# Patient Record
Sex: FEMALE | Race: WHITE | Employment: UNEMPLOYED | URBAN - METROPOLITAN AREA
[De-identification: names, ages, dates, MRNs, and addresses within clinical notes are randomized per-mention and may not be internally consistent; named-entity substitution may affect disease eponyms.]

---

## 2020-01-06 ENCOUNTER — OFFICE VISIT (OUTPATIENT)
Dept: FAMILY MEDICINE CLINIC | Facility: CLINIC | Age: 25
End: 2020-01-06
Payer: COMMERCIAL

## 2020-01-06 VITALS
DIASTOLIC BLOOD PRESSURE: 85 MMHG | OXYGEN SATURATION: 98 % | BODY MASS INDEX: 35.44 KG/M2 | TEMPERATURE: 99 F | HEIGHT: 63 IN | RESPIRATION RATE: 18 BRPM | WEIGHT: 200 LBS | SYSTOLIC BLOOD PRESSURE: 132 MMHG | HEART RATE: 98 BPM

## 2020-01-06 DIAGNOSIS — J22 LRTI (LOWER RESPIRATORY TRACT INFECTION): Primary | ICD-10-CM

## 2020-01-06 PROCEDURE — 99202 OFFICE O/P NEW SF 15 MIN: CPT | Performed by: NURSE PRACTITIONER

## 2020-01-06 RX ORDER — VALACYCLOVIR HYDROCHLORIDE 500 MG/1
TABLET, FILM COATED ORAL 2 TIMES DAILY
COMMUNITY
End: 2021-03-18

## 2020-01-06 RX ORDER — AZITHROMYCIN 250 MG/1
TABLET, FILM COATED ORAL
Qty: 6 TABLET | Refills: 0 | Status: SHIPPED | OUTPATIENT
Start: 2020-01-06 | End: 2020-03-03 | Stop reason: ALTCHOICE

## 2020-01-06 RX ORDER — PREDNISONE 10 MG/1
30 TABLET ORAL DAILY
Qty: 15 TABLET | Refills: 0 | Status: SHIPPED | OUTPATIENT
Start: 2020-01-06 | End: 2020-01-11

## 2020-01-06 RX ORDER — ALBUTEROL SULFATE 90 UG/1
2 AEROSOL, METERED RESPIRATORY (INHALATION) EVERY 4 HOURS PRN
Qty: 1 INHALER | Refills: 6 | Status: SHIPPED | OUTPATIENT
Start: 2020-01-06

## 2020-01-06 RX ORDER — ALBUTEROL SULFATE 2.5 MG/3ML
2.5 SOLUTION RESPIRATORY (INHALATION) EVERY 6 HOURS PRN
Qty: 30 VIAL | Refills: 0 | Status: SHIPPED | OUTPATIENT
Start: 2020-01-06

## 2020-01-06 NOTE — PROGRESS NOTES
CHIEF COMPLAINT:   Patient presents with:  URI  Cough      HPI:   Bam Mahmood is a 27year old female who presents for upper respiratory symptoms and cough w/ congestion. Cough, chest congestion, wheezing. Febrile initially.  OTC: cough suppressants, al EARS: TM's intact, non- bulging,  No retraction, no fluid, bony landmarks present  NOSE: Nostrils patent, scant nasal discharge, nasal mucosa mildly inflamed and edematous  THROAT: Oral mucosa pink, moist. Posterior pharynx is without erythema.  negative ex Bronchitis is an infection of the air passages. It often occurs during a cold and is usually caused by a virus. Symptoms include cough with mucus (phlegm) and low-grade fever.  This illness is contagious during the first few days and is spread through the a · Over-the-counter cough, cold, and sore-throat medicines will not shorten the length of the illness, but they may be helpful to reduce symptoms.  (Note: Don't use decongestants if you have high blood pressure.)  · If you were given an inhaler, use it exact

## 2020-03-03 ENCOUNTER — OFFICE VISIT (OUTPATIENT)
Dept: FAMILY MEDICINE CLINIC | Facility: CLINIC | Age: 25
End: 2020-03-03
Payer: COMMERCIAL

## 2020-03-03 VITALS
HEART RATE: 88 BPM | OXYGEN SATURATION: 98 % | BODY MASS INDEX: 37.39 KG/M2 | RESPIRATION RATE: 18 BRPM | HEIGHT: 63 IN | SYSTOLIC BLOOD PRESSURE: 105 MMHG | WEIGHT: 211 LBS | DIASTOLIC BLOOD PRESSURE: 55 MMHG | TEMPERATURE: 98 F

## 2020-03-03 DIAGNOSIS — F17.200 CURRENT SMOKER: ICD-10-CM

## 2020-03-03 DIAGNOSIS — J01.10 ACUTE NON-RECURRENT FRONTAL SINUSITIS: Primary | ICD-10-CM

## 2020-03-03 PROCEDURE — 99213 OFFICE O/P EST LOW 20 MIN: CPT | Performed by: NURSE PRACTITIONER

## 2020-03-03 RX ORDER — DOXYCYCLINE HYCLATE 100 MG/1
100 CAPSULE ORAL 2 TIMES DAILY
Qty: 20 CAPSULE | Refills: 0 | Status: SHIPPED | OUTPATIENT
Start: 2020-03-03 | End: 2020-03-13

## 2020-03-03 NOTE — PROGRESS NOTES
CHIEF COMPLAINT:   Patient presents with:  Cough  Chest Congestion  Cough/URI      HPI:   Dena Tomas is a 27year old female who presents for cold symptoms for  1  weeks. Symptoms have progressed into sinus congestion and been worsening since onset.  Leighton Patel EXAM:   /55 (BP Location: Left arm, Patient Position: Sitting, Cuff Size: large)   Pulse 88   Temp 98 °F (36.7 °C) (Oral)   Resp 18   Ht 63\"   Wt 211 lb (95.7 kg)   LMP 02/02/2020 (Exact Date)   SpO2 98%   BMI 37.38 kg/m²   GENERAL: well developed, Sinusitis can often be managed with self-care. Self-care can keep sinuses moist and make you feel more comfortable. Remember to follow your doctor's instructions closely. This can make a big difference in getting your sinus problem under control.   Drink fl The sinuses are air-filled spaces within the bones of the face. They connect to the inside of the nose. Sinusitis is an inflammation of the tissue that lines the sinuses. Sinusitis can occur during a cold.  It can also happen due to allergies to pollens and · Do not use nasal rinses or irrigation during an acute sinus infection, unless your healthcare provider tells you to. Rinsing may spread the infection to other areas in your sinuses.   · Use acetaminophen or ibuprofen to control pain, unless another pain m

## 2020-03-03 NOTE — PATIENT INSTRUCTIONS
Self-Care for Sinusitis     Drinking plenty of water can help sinuses drain. Sinusitis can often be managed with self-care. Self-care can keep sinuses moist and make you feel more comfortable. Remember to follow your doctor's instructions closely.  Natalia Frankel Sinusitis (Antibiotic Treatment)    The sinuses are air-filled spaces within the bones of the face. They connect to the inside of the nose. Sinusitis is an inflammation of the tissue that lines the sinuses. Sinusitis can occur during a cold.  It can also ha · Do not use nasal rinses or irrigation during an acute sinus infection, unless your healthcare provider tells you to. Rinsing may spread the infection to other areas in your sinuses.   · Use acetaminophen or ibuprofen to control pain, unless another pain m

## 2020-03-13 ENCOUNTER — HOSPITAL ENCOUNTER (OUTPATIENT)
Age: 25
Discharge: HOME | End: 2020-03-13
Attending: EMERGENCY MEDICINE
Payer: COMMERCIAL

## 2020-03-13 VITALS
SYSTOLIC BLOOD PRESSURE: 129 MMHG | WEIGHT: 200 LBS | BODY MASS INDEX: 35 KG/M2 | TEMPERATURE: 99 F | HEART RATE: 75 BPM | OXYGEN SATURATION: 98 % | DIASTOLIC BLOOD PRESSURE: 57 MMHG | RESPIRATION RATE: 18 BRPM

## 2020-03-13 DIAGNOSIS — J40 BRONCHITIS: Primary | ICD-10-CM

## 2020-03-13 PROCEDURE — 99204 OFFICE O/P NEW MOD 45 MIN: CPT

## 2020-03-13 PROCEDURE — 99213 OFFICE O/P EST LOW 20 MIN: CPT

## 2020-03-13 RX ORDER — BENZONATATE 100 MG/1
100 CAPSULE ORAL 3 TIMES DAILY PRN
Qty: 30 CAPSULE | Refills: 0 | Status: SHIPPED | OUTPATIENT
Start: 2020-03-13 | End: 2020-04-12

## 2020-03-13 RX ORDER — PREDNISONE 20 MG/1
40 TABLET ORAL DAILY
Qty: 8 TABLET | Refills: 0 | Status: SHIPPED | OUTPATIENT
Start: 2020-03-13 | End: 2020-03-17

## 2020-03-13 NOTE — ED INITIAL ASSESSMENT (HPI)
Pt has had a cough since march 1st.  Pt has been on doxycyline given by the walkin clinic. Pt states not better.

## 2020-03-13 NOTE — ED PROVIDER NOTES
Patient Seen in: [facility] In [address] [place] [address]    History   Patient presents with:  Cough/URI    Stated Complaint: cough f/u    HPI    Patient here with cough, congestion for 20+ days. No travel, no known sick contacts.   Patient denies si Temporal   SpO2 98 %   O2 Device None (Room air)       Current:/57   Pulse 75   Temp 98.6 °F (37 °C) (Temporal)   Resp 18   Wt 90.7 kg   LMP 03/06/2020   SpO2 98%   BMI 35.43 kg/m²   PULSE OX Nl on room air    GENERAL: awake, non toxic, no sig resp d

## 2020-10-10 ENCOUNTER — HOSPITAL ENCOUNTER (EMERGENCY)
Facility: HOSPITAL | Age: 25
Discharge: HOME | End: 2020-10-10
Payer: COMMERCIAL

## 2020-10-10 ENCOUNTER — APPOINTMENT (OUTPATIENT)
Dept: GENERAL RADIOLOGY | Facility: HOSPITAL | Age: 25
End: 2020-10-10
Payer: COMMERCIAL

## 2020-10-10 VITALS
BODY MASS INDEX: 34.55 KG/M2 | TEMPERATURE: 99 F | RESPIRATION RATE: 18 BRPM | HEART RATE: 75 BPM | DIASTOLIC BLOOD PRESSURE: 65 MMHG | OXYGEN SATURATION: 98 % | SYSTOLIC BLOOD PRESSURE: 111 MMHG | HEIGHT: 63 IN | WEIGHT: 195 LBS

## 2020-10-10 DIAGNOSIS — S52.121A CLOSED DISPLACED FRACTURE OF HEAD OF RIGHT RADIUS, INITIAL ENCOUNTER: Primary | ICD-10-CM

## 2020-10-10 PROCEDURE — 99284 EMERGENCY DEPT VISIT MOD MDM: CPT

## 2020-10-10 PROCEDURE — 73080 X-RAY EXAM OF ELBOW: CPT

## 2020-10-10 RX ORDER — HYDROCODONE BITARTRATE AND ACETAMINOPHEN 5; 325 MG/1; MG/1
1 TABLET ORAL ONCE
Status: COMPLETED | OUTPATIENT
Start: 2020-10-10 | End: 2020-10-10

## 2020-10-10 RX ORDER — HYDROCODONE BITARTRATE AND ACETAMINOPHEN 5; 325 MG/1; MG/1
1-2 TABLET ORAL EVERY 6 HOURS PRN
Qty: 10 TABLET | Refills: 0 | Status: SHIPPED | OUTPATIENT
Start: 2020-10-10 | End: 2020-10-17

## 2020-10-11 NOTE — ED PROVIDER NOTES
Patient Seen in: [facility] Emergency Department      History   Patient presents with:  Arm or Hand Injury    Stated Complaint: right elbow pain; fell while skating    HPI    22-year-old female presents to the emergency department with right elbow Capillary Refill: Capillary refill takes less than 2 seconds. Neurological:      General: No focal deficit present. Mental Status: She is alert and oriented to person, place, and time.            ED Course   Labs Reviewed - No data to display       P

## 2020-10-11 NOTE — ED INITIAL ASSESSMENT (HPI)
[place] her right elbow. Pulses intact.  States \"it feels like its locked\"

## 2020-10-12 ENCOUNTER — HOSPITAL ENCOUNTER (OUTPATIENT)
Dept: CT IMAGING | Age: 25
Discharge: HOME | End: 2020-10-12
Attending: ORTHOPAEDIC SURGERY
Payer: COMMERCIAL

## 2020-10-12 ENCOUNTER — OFFICE VISIT (OUTPATIENT)
Dept: ORTHOPEDICS CLINIC | Facility: CLINIC | Age: 25
End: 2020-10-12
Payer: COMMERCIAL

## 2020-10-12 VITALS
DIASTOLIC BLOOD PRESSURE: 72 MMHG | BODY MASS INDEX: 34.55 KG/M2 | SYSTOLIC BLOOD PRESSURE: 102 MMHG | HEIGHT: 63 IN | HEART RATE: 65 BPM | WEIGHT: 195 LBS

## 2020-10-12 DIAGNOSIS — S52.121A CLOSED DISPLACED FRACTURE OF HEAD OF RIGHT RADIUS, INITIAL ENCOUNTER: Primary | ICD-10-CM

## 2020-10-12 DIAGNOSIS — S52.121A CLOSED DISPLACED FRACTURE OF HEAD OF RIGHT RADIUS, INITIAL ENCOUNTER: ICD-10-CM

## 2020-10-12 PROCEDURE — 76376 3D RENDER W/INTRP POSTPROCES: CPT | Performed by: ORTHOPAEDIC SURGERY

## 2020-10-12 PROCEDURE — 20605 DRAIN/INJ JOINT/BURSA W/O US: CPT | Performed by: ORTHOPAEDIC SURGERY

## 2020-10-12 PROCEDURE — 3078F DIAST BP <80 MM HG: CPT | Performed by: ORTHOPAEDIC SURGERY

## 2020-10-12 PROCEDURE — 3008F BODY MASS INDEX DOCD: CPT | Performed by: ORTHOPAEDIC SURGERY

## 2020-10-12 PROCEDURE — 24650 CLTX RDL HEAD/NCK FX WO MNPJ: CPT | Performed by: ORTHOPAEDIC SURGERY

## 2020-10-12 PROCEDURE — 99243 OFF/OP CNSLTJ NEW/EST LOW 30: CPT | Performed by: ORTHOPAEDIC SURGERY

## 2020-10-12 PROCEDURE — 3074F SYST BP LT 130 MM HG: CPT | Performed by: ORTHOPAEDIC SURGERY

## 2020-10-12 PROCEDURE — 73200 CT UPPER EXTREMITY W/O DYE: CPT | Performed by: ORTHOPAEDIC SURGERY

## 2020-10-12 NOTE — PROCEDURES
The patient identified the right elbow as the correct procedure site. This was verified with the consent and with 2 patient identifiers.  The skin over the elbow lateral soft spot between the lateral epicondyle, olecranon and radial headwas prepped with be

## 2020-10-12 NOTE — H&P
NURSING INTAKE COMMENTS: Patient presents with:  Elbow Pain: right elbow fx, fell rollerskating      HPI: This 23year old female presents today with complaints of right elbow injury.   Patient sustained an acute injury to her right elbow when she fell dire denies blurred vision or double vision  HEENT: denies new nasal congestion  PULM: denies shortness of breath or cough  CARDIOVASCULAR: denies chest pain  GI: denies emesis, no diarrhea  :  denies dysuria or difficulty urinating  MUSCULOSKELETAL: See HPI were obtained. FINDINGS:  BONES: A mildly displaced acute obliquely oriented fracture of the radial head is evident. Minimal depression of the radial head ectasia surface is noted. No suspicious osseous lesions are seen.  The radiocapitellar and anterior h Patient expressed understanding and is amenable to this plan of care. The above note was creating using Dragon speech recognition technology. Please excuse any typos.     Reyes Sosa MD

## 2020-10-20 ENCOUNTER — HOSPITAL ENCOUNTER (OUTPATIENT)
Dept: GENERAL RADIOLOGY | Facility: HOSPITAL | Age: 25
Discharge: HOME | End: 2020-10-20
Attending: ORTHOPAEDIC SURGERY
Payer: COMMERCIAL

## 2020-10-20 ENCOUNTER — OFFICE VISIT (OUTPATIENT)
Dept: ORTHOPEDICS CLINIC | Facility: CLINIC | Age: 25
End: 2020-10-20
Payer: COMMERCIAL

## 2020-10-20 DIAGNOSIS — Z47.89 ORTHOPEDIC AFTERCARE: ICD-10-CM

## 2020-10-20 DIAGNOSIS — S52.124D CLOSED NONDISPLACED FRACTURE OF HEAD OF RIGHT RADIUS WITH ROUTINE HEALING, SUBSEQUENT ENCOUNTER: Primary | ICD-10-CM

## 2020-10-20 DIAGNOSIS — S52.045D NONDISPLACED FRACTURE OF CORONOID PROCESS OF LEFT ULNA, SUBSEQUENT ENCOUNTER FOR CLOSED FRACTURE WITH ROUTINE HEALING: ICD-10-CM

## 2020-10-20 PROCEDURE — 99024 POSTOP FOLLOW-UP VISIT: CPT | Performed by: ORTHOPAEDIC SURGERY

## 2020-10-20 PROCEDURE — 73080 X-RAY EXAM OF ELBOW: CPT | Performed by: ORTHOPAEDIC SURGERY

## 2020-10-20 NOTE — PROGRESS NOTES
NURSING INTAKE COMMENTS: Patient presents with:  Elbow Pain: folow up, increased ROM      HPI: This 23year old female presents today with complaints of 1 week follow-up evaluation right elbow fracture.   The patient sustained an acute injury to her right e touch and motor strength intact grossly  Musculoskeletal: Right elbow exam demonstrates no warmth or erythema. There is moderate edema about the elbow. There is tenderness to palpation of the radial head.   Significantly improved active elbow range of mot INDICATIONS: S52.121A Displaced fracture of head of right radius, initial encounter for closed fracture  TECHNIQUE: Multi-planar CT images of the right elbow were obtained without intravenous contrast material.  Automated exposure control for dose reductio found for: GLU, BUN, CREATSERUM, GFR, GFRNAA, GFRAA     Assessment and Plan:  Diagnoses and all orders for this visit:    Closed nondisplaced fracture of head of right radius with routine healing, subsequent encounter    Orthopedic aftercare  -     XR ELBO

## 2020-11-06 ENCOUNTER — OFFICE VISIT (OUTPATIENT)
Dept: ORTHOPEDICS CLINIC | Facility: CLINIC | Age: 25
End: 2020-11-06
Payer: COMMERCIAL

## 2020-11-06 ENCOUNTER — HOSPITAL ENCOUNTER (OUTPATIENT)
Dept: GENERAL RADIOLOGY | Facility: HOSPITAL | Age: 25
Discharge: HOME | End: 2020-11-06
Attending: ORTHOPAEDIC SURGERY
Payer: COMMERCIAL

## 2020-11-06 DIAGNOSIS — S52.045D NONDISPLACED FRACTURE OF CORONOID PROCESS OF LEFT ULNA, SUBSEQUENT ENCOUNTER FOR CLOSED FRACTURE WITH ROUTINE HEALING: ICD-10-CM

## 2020-11-06 DIAGNOSIS — S52.124D CLOSED NONDISPLACED FRACTURE OF HEAD OF RIGHT RADIUS WITH ROUTINE HEALING, SUBSEQUENT ENCOUNTER: Primary | ICD-10-CM

## 2020-11-06 DIAGNOSIS — Z47.89 ORTHOPEDIC AFTERCARE: ICD-10-CM

## 2020-11-06 PROCEDURE — 99024 POSTOP FOLLOW-UP VISIT: CPT | Performed by: ORTHOPAEDIC SURGERY

## 2020-11-06 PROCEDURE — 99072 ADDL SUPL MATRL&STAF TM PHE: CPT | Performed by: ORTHOPAEDIC SURGERY

## 2020-11-06 PROCEDURE — 73070 X-RAY EXAM OF ELBOW: CPT | Performed by: ORTHOPAEDIC SURGERY

## 2020-11-06 NOTE — PROGRESS NOTES
NURSING INTAKE COMMENTS: Patient presents with:  Fracture: Right elbow f/u - has stifness and she can't extended - has a better ROM       HPI: This 23year old female presents today with complaints of right elbow radial head and coronoid fracture follow-up benign  Neurologic: Bilateral sensation intact to light touch and motor strength intact grossly  Musculoskeletal: Right elbow exam demonstrates no warmth, no erythema, no edema. There is no tenderness to palpation about the elbow.   Active elbow range of m Brisa Berry MD on 10/20/2020 at 3:53 PM     Finalized by (CST): Chacorta Hearn MD on 10/20/2020 at 3:57 PM          Xr Elbow, Complete (min 3 Views), Right (cpt=73080)    Result Date: 10/11/2020  PROCEDURE: XR ELBOW, COMPLETE (MIN 3 VIEWS), RIGHT (CPT=7308 iterative reconstruction technique for dose reduction was used. Dose information is transmitted to the  [facility] of Radiology) NRDR ([address]) which includes the Dose Index Registry.   FINDINGS: Thin sections and 3-D rec with routine healing        Plan: Continue gentle stretching exercises as tolerated. No lifting greater than 5 pounds and no high risk activities. Follow-up in 3 weeks for repeat evaluation and x-rays.     The above note was creating using Dragon speech r

## 2021-03-18 ENCOUNTER — OFFICE VISIT (OUTPATIENT)
Dept: OBGYN CLINIC | Facility: CLINIC | Age: 26
End: 2021-03-18
Payer: COMMERCIAL

## 2021-03-18 VITALS
DIASTOLIC BLOOD PRESSURE: 73 MMHG | BODY MASS INDEX: 40 KG/M2 | SYSTOLIC BLOOD PRESSURE: 111 MMHG | HEART RATE: 79 BPM | WEIGHT: 226 LBS

## 2021-03-18 DIAGNOSIS — Z30.011 ORAL CONTRACEPTION INITIAL PRESCRIPTION: ICD-10-CM

## 2021-03-18 DIAGNOSIS — Z11.3 ROUTINE SCREENING FOR STI (SEXUALLY TRANSMITTED INFECTION): ICD-10-CM

## 2021-03-18 DIAGNOSIS — N89.8 VAGINAL ITCHING: Primary | ICD-10-CM

## 2021-03-18 DIAGNOSIS — B37.3 YEAST INFECTION OF THE VAGINA: ICD-10-CM

## 2021-03-18 DIAGNOSIS — A60.00 GENITAL HERPES SIMPLEX, UNSPECIFIED SITE: ICD-10-CM

## 2021-03-18 PROBLEM — Z86.19 HISTORY OF HERPES GENITALIS: Status: ACTIVE | Noted: 2021-03-18

## 2021-03-18 PROBLEM — Z30.41 USES ORAL CONTRACEPTION: Status: ACTIVE | Noted: 2021-03-18

## 2021-03-18 PROCEDURE — 3078F DIAST BP <80 MM HG: CPT | Performed by: ADVANCED PRACTICE MIDWIFE

## 2021-03-18 PROCEDURE — 87220 TISSUE EXAM FOR FUNGI: CPT | Performed by: ADVANCED PRACTICE MIDWIFE

## 2021-03-18 PROCEDURE — 3074F SYST BP LT 130 MM HG: CPT | Performed by: ADVANCED PRACTICE MIDWIFE

## 2021-03-18 PROCEDURE — 99203 OFFICE O/P NEW LOW 30 MIN: CPT | Performed by: ADVANCED PRACTICE MIDWIFE

## 2021-03-18 PROCEDURE — 87210 SMEAR WET MOUNT SALINE/INK: CPT | Performed by: ADVANCED PRACTICE MIDWIFE

## 2021-03-18 RX ORDER — VALACYCLOVIR HYDROCHLORIDE 1 G/1
1 TABLET, FILM COATED ORAL DAILY
Qty: 30 TABLET | Refills: 1 | Status: SHIPPED | OUTPATIENT
Start: 2021-03-18 | End: 2021-03-23

## 2021-03-18 RX ORDER — FLUCONAZOLE 150 MG/1
150 TABLET ORAL ONCE
Qty: 2 TABLET | Refills: 0 | Status: SHIPPED | OUTPATIENT
Start: 2021-03-18 | End: 2021-03-18

## 2021-03-18 RX ORDER — LEVONORGESTREL AND ETHINYL ESTRADIOL 0.1-0.02MG
1 KIT ORAL DAILY
Qty: 1 PACKAGE | Refills: 11 | Status: SHIPPED | OUTPATIENT
Start: 2021-03-18 | End: 2021-05-20

## 2021-03-18 NOTE — PROGRESS NOTES
HPI/Subjective:   Patient ID: Breezy Matson is a 23year old female. 25yo G0 presents with complaint of vaginal discharge and itching since Tuesday. Reports thick white discharge.  History of genital herpes and requesting refill on valcyclovir which sh lesion or injury. Vagina: No signs of injury and foreign body. Vaginal discharge present. No erythema, tenderness, bleeding, lesions or prolapsed vaginal walls. Cervix: No discharge, friability, lesion, erythema, cervical bleeding or eversion. from intercourse during outbreaks    Patient to return to care for well person exam and pap smear.     30 minutes spent reviewing chart, counseling and examining patient and charting

## 2021-03-18 NOTE — PATIENT INSTRUCTIONS
Yeast Infection (Candida Vaginal Infection)    You have a Candida vaginal infection. This is also known as a yeast infection. It's most often caused by a type of yeast (fungus) called Candida. Candida are normally found in the vagina.  But if they increas educational content on 7/1/2020  © 6823-0791 [employer]. All rights reserved. This information is not intended as a substitute for professional medical care. Always follow your healthcare professional's instructions. Birth Control:  Edwin Billy pressure or gallbladder, liver, cerebrovascular or heart disease  · You have diabetes, migraines, blood clot in the vein or artery, lupus, depression, certain lipid disorders, or take medicines that interfere with the pill  In these cases, discuss the risk

## 2021-03-19 LAB
C TRACH DNA SPEC QL NAA+PROBE: POSITIVE
N GONORRHOEA DNA SPEC QL NAA+PROBE: NEGATIVE

## 2021-03-20 LAB
GENITAL VAGINOSIS SCREEN: NEGATIVE
TRICHOMONAS SCREEN: NEGATIVE

## 2021-03-21 ENCOUNTER — TELEPHONE (OUTPATIENT)
Dept: OBGYN CLINIC | Facility: CLINIC | Age: 26
End: 2021-03-21

## 2021-03-21 DIAGNOSIS — A74.9 CHLAMYDIA INFECTION: Primary | ICD-10-CM

## 2021-03-21 NOTE — TELEPHONE ENCOUNTER
Phone call to patient to review lab results. Informed patient of positive chlamydia. Patient denies pelvic pain. States partner has groin pain. Discussed risks of pelvic inflammatory disease and importance of treatment of chlamydia in order to prevent.  Bryon

## 2021-03-22 ENCOUNTER — TELEPHONE (OUTPATIENT)
Dept: OBGYN CLINIC | Facility: CLINIC | Age: 26
End: 2021-03-22

## 2021-03-22 NOTE — TELEPHONE ENCOUNTER
Advised pt that rx was called in this morning & information confirmed. Reviewed abstaining from intercourse x7 days following treatment & jassi in 3 mos. Order placed for jassi.  Pt verbalized an understanding & agrees w/ plan

## 2021-03-22 NOTE — TELEPHONE ENCOUNTER
----- Message from Donovan Soares CNM sent at 3/21/2021 12:25 PM CDT -----  I called patient to inform her of results and sent her treatment. Partner's name is David PRINCE 89. She said to send his treatment to the same pharmacy. Thanks!

## 2021-04-10 RX ORDER — VALACYCLOVIR HYDROCHLORIDE 1 G/1
TABLET, FILM COATED ORAL
Qty: 30 TABLET | Refills: 1 | OUTPATIENT
Start: 2021-04-10

## 2021-04-10 NOTE — TELEPHONE ENCOUNTER
Spoke w/ pt regarding refill req. Pt states she did not req refill & does not need any. She only takes for outbreaks & has many left. Advised pt she missed her appt w/ [employer] on 4/8. Pt states she was busy moving. appt rescheduled.  Pt verbalized an understanding & agrees w/ plan

## 2021-04-12 ENCOUNTER — LAB ENCOUNTER (OUTPATIENT)
Dept: LAB | Facility: HOSPITAL | Age: 26
End: 2021-04-12
Attending: ADVANCED PRACTICE MIDWIFE
Payer: COMMERCIAL

## 2021-04-12 ENCOUNTER — TELEPHONE (OUTPATIENT)
Dept: OBGYN CLINIC | Facility: CLINIC | Age: 26
End: 2021-04-12

## 2021-04-12 DIAGNOSIS — Z32.00 PREGNANCY EXAMINATION OR TEST, PREGNANCY UNCONFIRMED: Primary | ICD-10-CM

## 2021-04-12 DIAGNOSIS — Z32.00 PREGNANCY EXAMINATION OR TEST, PREGNANCY UNCONFIRMED: ICD-10-CM

## 2021-04-12 PROCEDURE — 36415 COLL VENOUS BLD VENIPUNCTURE: CPT

## 2021-04-12 PROCEDURE — 84702 CHORIONIC GONADOTROPIN TEST: CPT

## 2021-04-12 NOTE — TELEPHONE ENCOUNTER
Pt states she started the OCP's 7 days ago. Pt is tired and sleepy. Pt had faint positive HPT and is requesting an order for a blood pregnancy test.  Per [facility] Quant ordered. Pt was advised she can go to the lab for the testing. Pt agrees with plan.

## 2021-04-12 NOTE — TELEPHONE ENCOUNTER
Patient calling stating she took home pregnancy test and has a faint positive. Just seen by Danielle Schneider recently and stated was given [facility] pills. LMP 2/14/21    Wants an order to have pregnancy test done.      Please advise

## 2021-05-10 ENCOUNTER — OFFICE VISIT (OUTPATIENT)
Dept: OBGYN CLINIC | Facility: CLINIC | Age: 26
End: 2021-05-10
Payer: COMMERCIAL

## 2021-05-10 VITALS
DIASTOLIC BLOOD PRESSURE: 79 MMHG | HEART RATE: 79 BPM | BODY MASS INDEX: 42 KG/M2 | SYSTOLIC BLOOD PRESSURE: 123 MMHG | WEIGHT: 235.19 LBS

## 2021-05-10 DIAGNOSIS — Z86.19 HISTORY OF CHLAMYDIA: ICD-10-CM

## 2021-05-10 DIAGNOSIS — N89.8 VAGINAL ITCHING: Primary | ICD-10-CM

## 2021-05-10 PROCEDURE — 3074F SYST BP LT 130 MM HG: CPT | Performed by: ADVANCED PRACTICE MIDWIFE

## 2021-05-10 PROCEDURE — 3078F DIAST BP <80 MM HG: CPT | Performed by: ADVANCED PRACTICE MIDWIFE

## 2021-05-10 PROCEDURE — 99214 OFFICE O/P EST MOD 30 MIN: CPT | Performed by: ADVANCED PRACTICE MIDWIFE

## 2021-05-10 PROCEDURE — 87210 SMEAR WET MOUNT SALINE/INK: CPT | Performed by: ADVANCED PRACTICE MIDWIFE

## 2021-05-10 RX ORDER — FLUCONAZOLE 150 MG/1
150 TABLET ORAL ONCE
Qty: 1 TABLET | Refills: 0 | Status: SHIPPED | OUTPATIENT
Start: 2021-05-10 | End: 2021-05-10

## 2021-05-10 NOTE — PROGRESS NOTES
HPI/Subjective:   Patient ID: Juan Jose Starkey is a 23year old female. Lindy Cm presents with complaints of vaginal itching/irritation and vulva feeling swollen. She was seen on 3/18/21 and subsequently treated for yeast and chlamydia.  She states that bot tenderness, bleeding, lesions or prolapsed vaginal walls. Cervix: No cervical motion tenderness, discharge, friability, lesion, erythema, cervical bleeding or eversion. Skin:     General: Skin is warm and dry.    Neurological:      Mental Status: She

## 2021-05-13 ENCOUNTER — TELEPHONE (OUTPATIENT)
Dept: OBGYN CLINIC | Facility: CLINIC | Age: 26
End: 2021-05-13

## 2021-05-13 NOTE — TELEPHONE ENCOUNTER
----- Message from Shanice Miranda CNM sent at 5/13/2021 10:31 AM CDT -----  Please call patient and let her know she was positive for yeast. I gave her a prescription for diflucan which she should have taken.  If she is still having symptoms, I can send [facility]

## 2021-05-20 ENCOUNTER — OFFICE VISIT (OUTPATIENT)
Dept: OBGYN CLINIC | Facility: CLINIC | Age: 26
End: 2021-05-20
Payer: COMMERCIAL

## 2021-05-20 VITALS
DIASTOLIC BLOOD PRESSURE: 75 MMHG | HEIGHT: 63 IN | WEIGHT: 231 LBS | SYSTOLIC BLOOD PRESSURE: 111 MMHG | BODY MASS INDEX: 40.93 KG/M2 | HEART RATE: 101 BPM

## 2021-05-20 DIAGNOSIS — Z12.4 SCREENING FOR MALIGNANT NEOPLASM OF CERVIX: ICD-10-CM

## 2021-05-20 DIAGNOSIS — Z01.419 ENCOUNTER FOR ANNUAL ROUTINE GYNECOLOGICAL EXAMINATION: Primary | ICD-10-CM

## 2021-05-20 DIAGNOSIS — Z30.41 USES ORAL CONTRACEPTION: ICD-10-CM

## 2021-05-20 PROCEDURE — 3008F BODY MASS INDEX DOCD: CPT | Performed by: ADVANCED PRACTICE MIDWIFE

## 2021-05-20 PROCEDURE — 3074F SYST BP LT 130 MM HG: CPT | Performed by: ADVANCED PRACTICE MIDWIFE

## 2021-05-20 PROCEDURE — 3078F DIAST BP <80 MM HG: CPT | Performed by: ADVANCED PRACTICE MIDWIFE

## 2021-05-20 PROCEDURE — 99395 PREV VISIT EST AGE 18-39: CPT | Performed by: ADVANCED PRACTICE MIDWIFE

## 2021-05-20 RX ORDER — LEVONORGESTREL AND ETHINYL ESTRADIOL 0.1-0.02MG
1 KIT ORAL DAILY
Qty: 3 PACKAGE | Refills: 4 | Status: SHIPPED | OUTPATIENT
Start: 2021-05-20

## 2021-05-20 NOTE — PROGRESS NOTES
HPI/Subjective:   Patient ID: Triston Lam is a 23year old female. Sundeep Smith is a 25yo G0 who presents for annual gyne exam. Reports regular periods. LMP 4/25/21. Recently started birth control pills and is happy with this method.  Was seen last week du General: Abdomen is flat. Bowel sounds are normal. There is no distension. Palpations: Abdomen is soft. There is no mass. Tenderness: There is no abdominal tenderness. There is no guarding or rebound. Hernia: No hernia is present.    [facility] STIs  F/u 1 year or prn

## 2021-07-17 ENCOUNTER — OFFICE VISIT (OUTPATIENT)
Dept: FAMILY MEDICINE CLINIC | Facility: CLINIC | Age: 26
End: 2021-07-17
Payer: COMMERCIAL

## 2021-07-17 VITALS
WEIGHT: 231 LBS | HEIGHT: 63 IN | RESPIRATION RATE: 18 BRPM | BODY MASS INDEX: 40.93 KG/M2 | HEART RATE: 72 BPM | OXYGEN SATURATION: 98 % | TEMPERATURE: 100 F | SYSTOLIC BLOOD PRESSURE: 118 MMHG | DIASTOLIC BLOOD PRESSURE: 70 MMHG

## 2021-07-17 DIAGNOSIS — J02.9 SORE THROAT: ICD-10-CM

## 2021-07-17 DIAGNOSIS — B34.9 ACUTE VIRAL SYNDROME: Primary | ICD-10-CM

## 2021-07-17 LAB
CONTROL LINE PRESENT WITH A CLEAR BACKGROUND (YES/NO): YES YES/NO
KIT LOT #: NORMAL NUMERIC
STREP GRP A CUL-SCR: NEGATIVE

## 2021-07-17 PROCEDURE — 3008F BODY MASS INDEX DOCD: CPT | Performed by: NURSE PRACTITIONER

## 2021-07-17 PROCEDURE — 3074F SYST BP LT 130 MM HG: CPT | Performed by: NURSE PRACTITIONER

## 2021-07-17 PROCEDURE — 99213 OFFICE O/P EST LOW 20 MIN: CPT | Performed by: NURSE PRACTITIONER

## 2021-07-17 PROCEDURE — 87880 STREP A ASSAY W/OPTIC: CPT | Performed by: NURSE PRACTITIONER

## 2021-07-17 PROCEDURE — 3078F DIAST BP <80 MM HG: CPT | Performed by: NURSE PRACTITIONER

## 2021-07-17 NOTE — PATIENT INSTRUCTIONS
Sudafed  Ibuprofen  Albuterol inhaler  Covid test ordered  Seek care in 3-5 days if symptoms do not improve or sooner if symptoms worsen  Viral Syndrome (Adult)  A viral illness may cause a number of symptoms such as fever.  Other symptoms depend on the par and sports drinks; and decaffeinated teas and coffee. If you have been diagnosed with a kidney disease, ask your healthcare provider how much and what types of fluids you should drink to prevent dehydration.  If you have kidney disease, drinking too much fl coronavirus that causes COVID-19, [facility] is here to provide community members reliable answers to any questions they may have. Please review the entirety of this informational document.   It includes information related to exposure, pendi • If you have symptoms, immediately self-isolate and contact your local public health authority or healthcare provider.   • Wear a mask, stay at least 6 feet from others, wash your hands, avoid crowds, and take other steps to prevent the spread of COVID-19 greater than 100.4 degrees Fahrenheit, you should contact your health care provider before seeking further care. Process measures to keep everyone safe in this difficult time are changing frequently.  Your healthcare provider can help direct you on next st telehealth follow-up.  CDC does not recommend repeat testing after a positive test.  Convalescent Plasma Donation Program  [facility], in conjunction with Fabiola Poon, is looking for patients who have recovered from COVID-19 and would be inter [employer]  http://www.[facility].[place].gov/topics-services/diseases-and-conditions/diseases-a-z-list/coronavirus  https://www.cdc.gov/coronavirus/2019-nc (n.d.). Retrieved May 11, 2021, from [employer].  What it means to be A Coronavirus \"long-hauler\". (2021, January 28). Retrieved March 17, 2021, from https://[facility]. [facility].org/what-it-means-to

## 2021-07-17 NOTE — PROGRESS NOTES
CHIEF COMPLAINT:   Patient presents with:  Cold: Tested negative for covid, but I’ve had fever, nausea, sore throat, congestion, diarrhea - Entered by patient      HPI:   Beau Mcknight is a 23year old female who presents with symptoms as described below Soln Take 3 mL (2.5 mg total) by nebulization every 6 (six) hours as needed for Wheezing or Shortness of Breath. (Patient not taking: Reported on 7/17/2021 ) 30 vial 0      Past Medical History:   Diagnosis Date   • Asthma       History reviewed.  No pertin 1/24/22 Date       ASSESSMENT AND PLAN:   Capri Catalan is a 23year old female who presents with upper respiratory symptoms that are consistent with    ASSESSMENT:   Acute viral syndrome  (primary encounter diagnosis)  Sore throat  Rapid strep negative of breath. If it settles in your stomach and intestinal tract, it may cause nausea, vomiting, cramping, and diarrhea. Sometimes it causes generalized symptoms like \"aching all over,\" feeling tired, loss of energy, or loss of appetite.   A viral illness us throat, nasal and sinus congestion, or diarrhea. Don't use decongestants if you have high blood pressure. Follow-up care  Follow up with your healthcare provider if you do not improve over the next week.   Call 911  Call 911 if any of the following occur: COVID-19 should quarantine at home for 14 days from the time of exposure and follow the below recommendations.   If you test positive for COVID-19, you should notify your family and friends with whom you have had close contact recently (starting 2 days befo to Manage Your Health at Home      1. Stay home from work, school, and away from other public places. If you must go out, avoid using any kind of public transportation, ridesharing, or taxis. 2. Monitor your symptoms carefully.  If your symptoms get worse If you have tested positive for COVID-19, you should remain under home isolation precautions following the below guidelines:  • At least 24 hours have passed since recovery defined as resolution of fever without the use of fever-reducing medications; and treatment for patients in our community who are most severely affected by the virus. How can I donate convalescent plasma? The process for donating plasma is very similar to donating blood.  [facility] in the [place] and being diagnosed with COVID-19.   Patients with Post-COVID conditions may experience one or more of the following symptoms:    Persistent severe fatigue Brain fog or trouble concentrating   Headaches Sleeping difficulties   Anxiety or depression Loss of tast

## 2021-07-18 LAB — SARS-COV-2 RNA RESP QL NAA+PROBE: NOT DETECTED

## (undated) NOTE — MR AVS SNAPSHOT
After Visit Summary   5/20/2021    Ayanna Garcia    MRN: [id number]           Visit Information     Date & Time  5/20/2021  3:00 PM Provider  Murali Baez [facility], [address], [address]  Phone  923- complete it and provide feedback. We strive to deliver the best patient experience and are looking for ways to make improvements. Your feedback will help us do so. For more information on [employer], please visit [email or web address] Average cost  $2,300*   *Cost varies based on your insurance coverage  For more information about hours, locations or appointment options available at [facility],   visit [email or web address]/[email or web address] or call [phone]. ([phone])

## (undated) NOTE — LETTER
Date: 3/3/2020    Patient Name: Dena Tomas          To Whom it may concern: This letter has been written at the patient's request. The above patient was seen at the [facility] for treatment of a medical condition.     This patient tessu

## (undated) NOTE — LETTER
Date & Time: 3/13/2020, 4:10 PM  Patient: ta Big  Encounter Provider(s):    Palma Mixon MD       To Whom It May Concern:    Julieta Manning was seen and treated in our department on 3/13/2020. She is excused from work for 2-3 days.     If you h